# Patient Record
(demographics unavailable — no encounter records)

---

## 2025-05-19 NOTE — HISTORY OF PRESENT ILLNESS
[FreeTextEntry1] :  is an otherwise healthy and active 3-year-old boy brought in by his mother after being sent by his pediatrician for an orthopedic evaluation of a left shoulder injury sustained on May 6 after falling out of a hammock.  [facility].  Mother was told that he had a fracture and he was given a sling which she is still using.  Unfortunately, we have no access to the imaging studies.  Overall, he has been doing well.

## 2025-05-19 NOTE — CONSULT LETTER
[Dear  ___] : Dear  [unfilled], [Consult Letter:] : I had the pleasure of evaluating your patient, [unfilled]. [Please see my note below.] : Please see my note below. [Consult Closing:] : Thank you very much for allowing me to participate in the care of this patient.  If you have any questions, please do not hesitate to contact me. [Sincerely,] : Sincerely, [FreeTextEntry3] : Cirilo Hebert MD [facility] [address] [address] Phone: [phone] Fax: [phone]

## 2025-05-19 NOTE — REASON FOR VISIT
[Initial Evaluation] : an initial evaluation [Patient] : patient [Mother] : mother [FreeTextEntry1] : Left shoulder injury

## 2025-05-19 NOTE — ASSESSMENT
[FreeTextEntry1] : Diagnosis: Well-healing slightly displaced fracture shaft left clavicle.  The history was obtained today from the child and parent; given the patient's age and/or the child's mental capacity, the history was unreliable and the parent was used as an independent historian..i  Healthy 3-year-old boy 10 days status post the above fracture.  He is doing well.  Mother is informed of the nature of the fracture.  He may discontinue his sling.  No playground activities for 2 weeks.  Follow-up on a as needed basis.  All of the mother's questions were addressed. She understood and agreed with the plan.  The office visit is conducted in [place], the family's native language.  This note was generated using Dragon medical dictation software.  A reasonable effort has been made for proofreading its contents, but typos may still remain.  If there are any questions or points of clarification needed please do not hesitate to contact my office.

## 2025-05-19 NOTE — PHYSICAL EXAM
[FreeTextEntry1] : Alert, well-developed, in no apparent distress 3-year-old boy who does not like to be examined.  He has some discomfort to palpation of his left clavicle.  Limitation of movement of his left arm.  Skin is intact.  Neurovascularly grossly intact with active movement of his fingers on the his left upper extremity.

## 2025-05-19 NOTE — DATA REVIEWED
[de-identified] : X-rays of his left shoulder taken today including 2 views were reviewed by me.  They show a transverse slightly displaced without angulation midshaft fracture left clavicle.

## 2025-05-19 NOTE — DEVELOPMENTAL MILESTONES
[Normal] : Developmental history within normal limits [Walk ___ Months] : Walk: [unfilled] months [Verbally] : verbally [Right] : right [FreeTextEntry2] : No [FreeTextEntry3] : Left shoulder immobilizer